# Patient Record
Sex: FEMALE | Race: BLACK OR AFRICAN AMERICAN | NOT HISPANIC OR LATINO | RURAL
[De-identification: names, ages, dates, MRNs, and addresses within clinical notes are randomized per-mention and may not be internally consistent; named-entity substitution may affect disease eponyms.]

---

## 2024-10-14 ENCOUNTER — HOSPITAL ENCOUNTER (EMERGENCY)
Facility: HOSPITAL | Age: 39
Discharge: HOME | End: 2024-10-14
Payer: COMMERCIAL

## 2024-10-14 VITALS
BODY MASS INDEX: 46.98 KG/M2 | OXYGEN SATURATION: 99 % | SYSTOLIC BLOOD PRESSURE: 149 MMHG | WEIGHT: 282 LBS | HEIGHT: 65 IN | HEART RATE: 78 BPM | DIASTOLIC BLOOD PRESSURE: 95 MMHG | TEMPERATURE: 99 F | RESPIRATION RATE: 20 BRPM

## 2024-10-14 DIAGNOSIS — M25.561 ACUTE PAIN OF RIGHT KNEE: Primary | ICD-10-CM

## 2024-10-14 PROCEDURE — 25000003 PHARM REV CODE 250: Performed by: NURSE PRACTITIONER

## 2024-10-14 PROCEDURE — 63600175 PHARM REV CODE 636 W HCPCS: Performed by: NURSE PRACTITIONER

## 2024-10-14 PROCEDURE — 96372 THER/PROPH/DIAG INJ SC/IM: CPT | Performed by: NURSE PRACTITIONER

## 2024-10-14 PROCEDURE — 99284 EMERGENCY DEPT VISIT MOD MDM: CPT | Mod: 25

## 2024-10-14 RX ORDER — KETOROLAC TROMETHAMINE 30 MG/ML
60 INJECTION, SOLUTION INTRAMUSCULAR; INTRAVENOUS
Status: COMPLETED | OUTPATIENT
Start: 2024-10-14 | End: 2024-10-14

## 2024-10-14 RX ORDER — MELOXICAM 7.5 MG/1
7.5 TABLET ORAL DAILY
Qty: 15 TABLET | Refills: 0 | Status: SHIPPED | OUTPATIENT
Start: 2024-10-14

## 2024-10-14 RX ORDER — HYDROCODONE BITARTRATE AND ACETAMINOPHEN 7.5; 325 MG/1; MG/1
1 TABLET ORAL
Status: COMPLETED | OUTPATIENT
Start: 2024-10-14 | End: 2024-10-14

## 2024-10-14 RX ADMIN — KETOROLAC TROMETHAMINE 60 MG: 30 INJECTION, SOLUTION INTRAMUSCULAR at 08:10

## 2024-10-14 RX ADMIN — HYDROCODONE BITARTRATE AND ACETAMINOPHEN 1 TABLET: 7.5; 325 TABLET ORAL at 08:10

## 2024-10-14 NOTE — Clinical Note
"Jahaira Cowandaniel" Bernadine was seen and treated in our emergency department on 10/14/2024.  She may return to work on 10/16/2024.       If you have any questions or concerns, please don't hesitate to call.      Vemla Wetzel, FNP"

## 2024-10-14 NOTE — ED TRIAGE NOTES
Pt states that she is having her R knee locking on her, then she stepped in a hole in the parking lot and it twisted.

## 2024-10-15 NOTE — ED PROVIDER NOTES
Encounter Date: 10/14/2024       History     Chief Complaint   Patient presents with    Knee Injury     R knee     Patient presents to ER with complaint of right knee pain. Patient reports she has chronic pain in her left knee. She states this has caused her to walk differently and now her right knee is swollen and painful to bear weight.  She denies known injury or accident.  Denies significant medical or surgical history.    The history is provided by the patient. No  was used.     Review of patient's allergies indicates:  No Known Allergies  History reviewed. No pertinent past medical history.  History reviewed. No pertinent surgical history.  No family history on file.  Social History     Tobacco Use    Smoking status: Never    Smokeless tobacco: Never     Review of Systems   Constitutional:  Positive for activity change and fatigue.   Musculoskeletal:  Positive for arthralgias (right knee pain, and chronic left knee pain) and joint swelling (right knee edema).   All other systems reviewed and are negative.      Physical Exam     Initial Vitals [10/14/24 1839]   BP Pulse Resp Temp SpO2   133/89 78 18 98.8 °F (37.1 °C) 99 %      MAP       --         Physical Exam    Nursing note and vitals reviewed.  Constitutional: She appears well-developed and well-nourished.   HENT:   Head: Normocephalic.   Nose: Nose normal. Mouth/Throat: Oropharynx is clear and moist.   Eyes: Conjunctivae and EOM are normal.   Neck: Neck supple.   Normal range of motion.  Cardiovascular:  Normal rate, normal heart sounds and intact distal pulses.           Pulmonary/Chest: Breath sounds normal.   Abdominal: Abdomen is soft. Bowel sounds are normal.   Musculoskeletal:         General: Tenderness and edema present.      Cervical back: Normal range of motion and neck supple.      Comments: Right knee edema. Limited ROM due to pain.      Neurological: She is alert and oriented to person, place, and time. She has normal  strength. GCS score is 15. GCS eye subscore is 4. GCS verbal subscore is 5. GCS motor subscore is 6.   Skin: Skin is warm and dry. Capillary refill takes less than 2 seconds.   Psychiatric: She has a normal mood and affect. Thought content normal.         Medical Screening Exam   See Full Note    ED Course   Procedures  Labs Reviewed - No data to display       Imaging Results              X-Ray Knee 1 or 2 View Right (Final result)  Result time 10/14/24 19:51:46      Final result by Elpidio Avila MD (10/14/24 19:51:46)                   Impression:      1. No acute displaced fracture or dislocation of the knee.      Electronically signed by: Elpidio Avila MD  Date:    10/14/2024  Time:    19:51               Narrative:    EXAMINATION:  XR KNEE 1 OR 2 VIEW RIGHT    CLINICAL HISTORY:  Right knee pain;    TECHNIQUE:  AP and lateral views of the right knee were performed.    COMPARISON:  None    FINDINGS:  Two views right knee.    No acute displaced fracture or dislocation of the knee.  No radiopaque foreign body.  No significant edema.  No large knee joint effusion.                                       Medications   ketorolac injection 60 mg (60 mg Intramuscular Given 10/14/24 2056)   HYDROcodone-acetaminophen 7.5-325 mg per tablet 1 tablet (1 tablet Oral Given 10/14/24 2056)     Medical Decision Making  Patient presents to ER with complaint of right knee pain. Patient reports she has chronic pain in her left knee. She states this has caused her to walk differently and now her right knee is swollen and painful to bear weight.  She denies known injury or accident.  Denies significant medical or surgical history.      Amount and/or Complexity of Data Reviewed  Radiology: ordered. Decision-making details documented in ED Course.     Details: Right knee x-ray negative for fracture or dislocation  Discussion of management or test interpretation with external provider(s): Toradol 60 mg IM  Norco 7.5 mg p.o. treat  pain  Patient verbalized improvement of pain prior to discharge    Patient was discharged home with diagnosis of acute pain of right knee.  She was given prescription for meloxicam to take as prescribed.  She was told to rest ice and elevate extremity for comfort.  She was instructed to call orthopedic clinic to schedule follow up appointment with Dr. Portillo.  She was given crutches to use for nonweightbearing until follow up with orthopedic clinic.  Patient verbalizes understanding agrees with plan of care.     Risk  Prescription drug management.                                      Clinical Impression:   Final diagnoses:  [M25.561] Acute pain of right knee (Primary)        ED Disposition Condition    Discharge Stable          ED Prescriptions       Medication Sig Dispense Start Date End Date Auth. Provider    meloxicam (MOBIC) 7.5 MG tablet Take 1 tablet (7.5 mg total) by mouth once daily. 15 tablet 10/14/2024 -- Velma Wetzel FNP          Follow-up Information       Follow up With Specialties Details Why Contact Info    Jung Portillo III, MD Orthopedic Surgery Schedule an appointment as soon as possible for a visit in 2 days If symptoms worsen [address] [place] [id number]  [phone]               Velma Wetzel FNP  10/14/24 0934

## 2024-10-15 NOTE — ED NOTES
Pt states she does have a ride home and is aware that she can not drive after taking the narcotic for at least 4 hours.  Pt states he ride will be picking her up.  Medications explained and toradol inj given and norco po given.

## 2024-10-15 NOTE — DISCHARGE INSTRUCTIONS
Take medication as prescribed.  Use crutches for nonweightbearing.  Schedule follow up appointment with Dr. Portillo in the orthopedic clinic.  Rest ice and elevate for comfort.  Return to the ER with new or worsening symptoms.

## 2024-10-17 ENCOUNTER — TELEPHONE (OUTPATIENT)
Dept: ORTHOPEDICS | Facility: CLINIC | Age: 39
End: 2024-10-17
Payer: COMMERCIAL

## 2024-10-17 NOTE — TELEPHONE ENCOUNTER
Attempted to call pt. No answer. Left message for pt. To call back. Pt. May be schedule with Yakelin or Madeline.

## 2025-03-28 ENCOUNTER — HOSPITAL ENCOUNTER (EMERGENCY)
Facility: HOSPITAL | Age: 40
Discharge: HOME | End: 2025-03-28
Payer: COMMERCIAL

## 2025-03-28 VITALS
RESPIRATION RATE: 18 BRPM | SYSTOLIC BLOOD PRESSURE: 121 MMHG | WEIGHT: 280 LBS | TEMPERATURE: 99 F | BODY MASS INDEX: 46.59 KG/M2 | DIASTOLIC BLOOD PRESSURE: 63 MMHG | HEART RATE: 86 BPM | OXYGEN SATURATION: 95 %

## 2025-03-28 DIAGNOSIS — Z87.898 HISTORY OF SEIZURES: ICD-10-CM

## 2025-03-28 DIAGNOSIS — R53.1 WEAKNESS: ICD-10-CM

## 2025-03-28 DIAGNOSIS — R32 URINARY INCONTINENCE, UNSPECIFIED TYPE: Primary | ICD-10-CM

## 2025-03-28 DIAGNOSIS — R42 DIZZINESS: ICD-10-CM

## 2025-03-28 LAB
ABORH RETYPE: NORMAL
ALBUMIN SERPL BCP-MCNC: 3.5 G/DL (ref 3.5–5)
ALBUMIN/GLOB SERPL: 1.1 {RATIO}
ALP SERPL-CCNC: 81 U/L (ref 40–150)
ALT SERPL W P-5'-P-CCNC: 18 U/L
ANION GAP SERPL CALCULATED.3IONS-SCNC: 12 MMOL/L (ref 7–16)
AST SERPL W P-5'-P-CCNC: 28 U/L (ref 11–45)
BASOPHILS # BLD AUTO: 0.04 K/UL (ref 0–0.2)
BASOPHILS NFR BLD AUTO: 0.5 % (ref 0–1)
BILIRUB SERPL-MCNC: 0.3 MG/DL
BILIRUB UR QL STRIP: NEGATIVE
BUN SERPL-MCNC: 8 MG/DL (ref 7–19)
BUN/CREAT SERPL: 12 (ref 6–20)
CALCIUM SERPL-MCNC: 9.2 MG/DL (ref 8.4–10.2)
CHLORIDE SERPL-SCNC: 105 MMOL/L (ref 98–107)
CLARITY UR: CLEAR
CO2 SERPL-SCNC: 26 MMOL/L (ref 22–29)
COLOR UR: ABNORMAL
CREAT SERPL-MCNC: 0.68 MG/DL (ref 0.55–1.02)
DIFFERENTIAL METHOD BLD: ABNORMAL
EGFR (NO RACE VARIABLE) (RUSH/TITUS): 114 ML/MIN/1.73M2
EOSINOPHIL # BLD AUTO: 0.13 K/UL (ref 0–0.5)
EOSINOPHIL NFR BLD AUTO: 1.7 % (ref 1–4)
ERYTHROCYTE [DISTWIDTH] IN BLOOD BY AUTOMATED COUNT: 13.1 % (ref 11.5–14.5)
GLOBULIN SER-MCNC: 3.3 G/DL (ref 2–4)
GLUCOSE SERPL-MCNC: 103 MG/DL (ref 70–105)
GLUCOSE SERPL-MCNC: 92 MG/DL (ref 74–100)
GLUCOSE UR STRIP-MCNC: NORMAL MG/DL
HCT VFR BLD AUTO: 38.4 % (ref 38–47)
HGB BLD-MCNC: 12 G/DL (ref 12–16)
IMM GRANULOCYTES # BLD AUTO: 0.06 K/UL (ref 0–0.04)
IMM GRANULOCYTES NFR BLD: 0.8 % (ref 0–0.4)
INDIRECT COOMBS: NORMAL
KETONES UR STRIP-SCNC: NEGATIVE MG/DL
LEUKOCYTE ESTERASE UR QL STRIP: ABNORMAL
LYMPHOCYTES # BLD AUTO: 2.48 K/UL (ref 1–4.8)
LYMPHOCYTES NFR BLD AUTO: 32.9 % (ref 27–41)
MAGNESIUM SERPL-MCNC: 1.9 MG/DL (ref 1.6–2.6)
MCH RBC QN AUTO: 28.3 PG (ref 27–31)
MCHC RBC AUTO-ENTMCNC: 31.3 G/DL (ref 32–36)
MCV RBC AUTO: 90.6 FL (ref 80–96)
MONOCYTES # BLD AUTO: 0.42 K/UL (ref 0–0.8)
MONOCYTES NFR BLD AUTO: 5.6 % (ref 2–6)
MPC BLD CALC-MCNC: 12.6 FL (ref 9.4–12.4)
MUCOUS, UA: ABNORMAL /LPF
NEUTROPHILS # BLD AUTO: 4.41 K/UL (ref 1.8–7.7)
NEUTROPHILS NFR BLD AUTO: 58.5 % (ref 53–65)
NITRITE UR QL STRIP: NEGATIVE
NRBC # BLD AUTO: 0 X10E3/UL
NRBC, AUTO (.00): 0 %
PH UR STRIP: 6.5 PH UNITS
PLATELET # BLD AUTO: 211 K/UL (ref 150–400)
POTASSIUM SERPL-SCNC: 4.2 MMOL/L (ref 3.5–5.1)
PROT SERPL-MCNC: 6.8 G/DL (ref 6.4–8.3)
PROT UR QL STRIP: NEGATIVE
RBC # BLD AUTO: 4.24 M/UL (ref 4.2–5.4)
RBC # UR STRIP: NEGATIVE /UL
RBC #/AREA URNS HPF: 1 /HPF
RH BLD: NORMAL
SODIUM SERPL-SCNC: 139 MMOL/L (ref 136–145)
SP GR UR STRIP: 1.01
SPECIMEN OUTDATE: NORMAL
SQUAMOUS #/AREA URNS LPF: ABNORMAL /HPF
TSH SERPL DL<=0.005 MIU/L-ACNC: 1.43 UIU/ML (ref 0.35–4.94)
UROBILINOGEN UR STRIP-ACNC: NORMAL MG/DL
WBC # BLD AUTO: 7.54 K/UL (ref 4.5–11)
WBC #/AREA URNS HPF: 2 /HPF

## 2025-03-28 PROCEDURE — 86900 BLOOD TYPING SEROLOGIC ABO: CPT | Performed by: NURSE PRACTITIONER

## 2025-03-28 PROCEDURE — 83735 ASSAY OF MAGNESIUM: CPT | Performed by: NURSE PRACTITIONER

## 2025-03-28 PROCEDURE — 85025 COMPLETE CBC W/AUTO DIFF WBC: CPT | Performed by: NURSE PRACTITIONER

## 2025-03-28 PROCEDURE — 80053 COMPREHEN METABOLIC PANEL: CPT | Performed by: NURSE PRACTITIONER

## 2025-03-28 PROCEDURE — 82962 GLUCOSE BLOOD TEST: CPT

## 2025-03-28 PROCEDURE — 81003 URINALYSIS AUTO W/O SCOPE: CPT | Performed by: NURSE PRACTITIONER

## 2025-03-28 PROCEDURE — 93010 ELECTROCARDIOGRAM REPORT: CPT | Mod: ,,, | Performed by: INTERNAL MEDICINE

## 2025-03-28 PROCEDURE — 36415 COLL VENOUS BLD VENIPUNCTURE: CPT | Performed by: NURSE PRACTITIONER

## 2025-03-28 PROCEDURE — 93005 ELECTROCARDIOGRAM TRACING: CPT

## 2025-03-28 PROCEDURE — 99284 EMERGENCY DEPT VISIT MOD MDM: CPT | Mod: 25

## 2025-03-28 NOTE — Clinical Note
"Jahaira Cowandaniel" Bernadine was seen and treated in our emergency department on 3/28/2025.  She may return to work on 03/31/2025.       If you have any questions or concerns, please don't hesitate to call.      Adelaide Friedman, FNP"

## 2025-03-28 NOTE — DISCHARGE INSTRUCTIONS
Follow up/establish care with PCP in the next 1 week. Neurology referral to re-establish care/evaluate seizure history. Return to ED if any new or worsening of symptoms occur.

## 2025-03-28 NOTE — ED PROVIDER NOTES
Encounter Date: 3/28/2025       History     Chief Complaint   Patient presents with    Blurred Vision    Urinary Incontinence    Dizziness     39 year old female presents to ED with complaint of weakness, dizziness, and urinary incontinence. Patient states she has been weak/dizzy the last few days with worsening on this morning but went to work. She states she urinated on herself at work and was noted by co-workers to be dizzy and walking sideways. She states she has been having problems with her bowels/urine and going on herself for several weeks. Patient states she has a history of seizures and has not been on medications in 2 or more years; reasons not disclosed. She also reports having abnormal pelvis scan but states she did not workup the issue due to seizure activity and being scared. Patient also reports history of anemia requiring blood/iron transfusions but has not had one in several years. Denies known fever, chills, chest pain, shortness of breath.     The history is provided by the patient.     Review of patient's allergies indicates:  No Known Allergies  Past Medical History:   Diagnosis Date    Anemia     Anxiety attack     Bipolar disorder     Seizures      Past Surgical History:   Procedure Laterality Date     SECTION       No family history on file.  Social History[1]  Review of Systems   Constitutional:  Negative for chills and fever.   Eyes:  Negative for photophobia and visual disturbance.   Respiratory:  Negative for cough and shortness of breath.    Cardiovascular:  Negative for chest pain and palpitations.   Gastrointestinal:  Negative for nausea and vomiting.   Genitourinary:  Positive for urgency. Negative for frequency.   Musculoskeletal:  Negative for arthralgias and gait problem.   Skin:  Negative for color change and wound.   Neurological:  Positive for dizziness and weakness.   Hematological:  Negative for adenopathy. Does not bruise/bleed easily.   Psychiatric/Behavioral:   Negative for agitation and confusion.    All other systems reviewed and are negative.      Physical Exam     Initial Vitals [03/28/25 1444]   BP Pulse Resp Temp SpO2   128/89 92 18 98.8 °F (37.1 °C) 99 %      MAP       --         Physical Exam    Nursing note and vitals reviewed.  Constitutional: She appears well-developed and well-nourished.   HENT:   Head: Normocephalic and atraumatic.   Eyes: EOM are normal. Pupils are equal, round, and reactive to light.   Neck: Neck supple.   Normal range of motion.  Cardiovascular:  Normal rate and regular rhythm.           No murmur heard.  Pulmonary/Chest: She has no wheezes. She has no rhonchi.   Abdominal: Abdomen is soft. She exhibits no distension. There is no abdominal tenderness.   Musculoskeletal:         General: No tenderness or edema.      Cervical back: Normal range of motion and neck supple.     Lymphadenopathy:     She has no cervical adenopathy.   Neurological: She is alert and oriented to person, place, and time. No cranial nerve deficit or sensory deficit.   Skin: Skin is warm and dry. Capillary refill takes less than 2 seconds.   Psychiatric: She has a normal mood and affect. Thought content normal.         Medical Screening Exam   See Full Note    ED Course   Procedures  Labs Reviewed   URINALYSIS, REFLEX TO URINE CULTURE - Abnormal       Result Value    Color, UA Light Yellow      Clarity, UA Clear      pH, UA 6.5      Leukocytes, UA Small (*)     Nitrites, UA Negative      Protein, UA Negative      Glucose, UA Normal      Ketones, UA Negative      Urobilinogen, UA Normal      Bilirubin, UA Negative      Blood, UA Negative      Specific Gravity, UA 1.012     CBC WITH DIFFERENTIAL - Abnormal    WBC 7.54      RBC 4.24      Hemoglobin 12.0      Hematocrit 38.4      MCV 90.6      MCH 28.3      MCHC 31.3 (*)     RDW 13.1      Platelet Count 211      MPV 12.6 (*)     Neutrophils % 58.5      Lymphocytes % 32.9      Monocytes % 5.6      Eosinophils % 1.7       Basophils % 0.5      Immature Granulocytes % 0.8 (*)     nRBC, Auto 0.0      Neutrophils, Abs 4.41      Lymphocytes, Absolute 2.48      Monocytes, Absolute 0.42      Eosinophils, Absolute 0.13      Basophils, Absolute 0.04      Immature Granulocytes, Absolute 0.06 (*)     nRBC, Absolute 0.00      Diff Type Auto     URINALYSIS, MICROSCOPIC - Abnormal    WBC, UA 2      RBC, UA 1      Squamous Epithelial Cells, UA Occasional (*)     Mucous Occasional (*)    MAGNESIUM - Normal    Magnesium 1.9     TSH - Normal    TSH 1.429     CBC W/ AUTO DIFFERENTIAL    Narrative:     The following orders were created for panel order CBC auto differential.  Procedure                               Abnormality         Status                     ---------                               -----------         ------                     CBC with Differential[[id number]]       Abnormal            Final result                 Please view results for these tests on the individual orders.   COMPREHENSIVE METABOLIC PANEL    Sodium 139      Potassium 4.2      Chloride 105      CO2 26      Anion Gap 12      Glucose 92      BUN 8      Creatinine 0.68      BUN/Creatinine Ratio 12      Calcium 9.2      Total Protein 6.8      Albumin 3.5      Globulin 3.3      A/G Ratio 1.1      Bilirubin, Total 0.3      Alk Phos 81      ALT 18      AST 28      eGFR 114     TYPE & SCREEN    Specimen Outdate 03/31/2025 23:59      Group & Rh A POS      Indirect Carlos NEG     ABORH RETYPE    ABORH Retype A POS     POCT GLUCOSE MONITORING CONTINUOUS    POC Glucose 103            Imaging Results    None          Medications - No data to display  Medical Decision Making  39 year old female presents to ED with complaint of weakness, dizziness, and urinary incontinence. Patient states she has been weak/dizzy the last few days with worsening on this morning but went to work. She states she urinated on herself at work and was noted by co-workers to be dizzy and walking sideways.  She states she has been having problems with her bowels/urine and going on herself for several weeks. Patient states she has a history of seizures and has not been on medications in 2 or more years; reasons not disclosed. She also reports having abnormal pelvis scan but states she did not workup the issue due to seizure activity and being scared. Patient also reports history of anemia requiring blood/iron transfusions but has not had one in several years. Denies known fever, chills, chest pain, shortness of breath.       Labs ordered and reviewed  Discussed findings with patient and need for PCP and to re-establish care with Neurology due to history of seizures.     EKG ordered and reviewed  EKG significant for no ST elevation  Rate 86  Rhythm sinus rhythm  Interpreted by ED physician        Amount and/or Complexity of Data Reviewed  Labs: ordered.                                      Clinical Impression:   Final diagnoses:  [R53.1] Weakness  [R32] Urinary incontinence, unspecified type (Primary)  [Z87.898] History of seizures  [R42] Dizziness        ED Disposition Condition    Discharge Stable          ED Prescriptions    None       Follow-up Information       Follow up With Specialties Details Why Contact Info    Mary Beth Keene MD Family Medicine Schedule an appointment as soon as possible for a visit   [address] [facility] [place] [address] [phone]                 [1]   Social History  Tobacco Use    Smoking status: Never    Smokeless tobacco: Never   Substance Use Topics    Drug use: Never        Adelaide Friedman, DORIAN  03/28/25 5333

## 2025-03-28 NOTE — ED TRIAGE NOTES
Patient presents to ED with c/o blurry vision, dizziness, and urinary incontinence.  States that she was at work when she all of a sudden became dizzy, had blurred vision and became incontinent.  States she had a prior episode this week of fecal incontinence as well.

## 2025-03-29 LAB
OHS QRS DURATION: 88 MS
OHS QTC CALCULATION: 416 MS

## 2025-06-14 ENCOUNTER — HOSPITAL ENCOUNTER (EMERGENCY)
Facility: HOSPITAL | Age: 40
Discharge: HOME | End: 2025-06-14
Attending: EMERGENCY MEDICINE

## 2025-06-14 VITALS
HEART RATE: 76 BPM | HEIGHT: 65 IN | BODY MASS INDEX: 47.15 KG/M2 | WEIGHT: 283 LBS | RESPIRATION RATE: 16 BRPM | DIASTOLIC BLOOD PRESSURE: 86 MMHG | OXYGEN SATURATION: 98 % | TEMPERATURE: 98 F | SYSTOLIC BLOOD PRESSURE: 129 MMHG

## 2025-06-14 DIAGNOSIS — F32.A DEPRESSION, UNSPECIFIED DEPRESSION TYPE: Primary | ICD-10-CM

## 2025-06-14 DIAGNOSIS — R53.83 FATIGUE, UNSPECIFIED TYPE: ICD-10-CM

## 2025-06-14 PROCEDURE — 99281 EMR DPT VST MAYX REQ PHY/QHP: CPT

## 2025-06-14 NOTE — ED PROVIDER NOTES
Encounter Date: 2025    SCRIBE #1 NOTE: I, Jagdeep Cruz, am scribing for, and in the presence of,  Calixto Leary MD. I have scribed the entire note.       History     Chief Complaint   Patient presents with    Headache    Dizziness     Presents to ED for complaints of headache and dizziness since yesterday.  Patient came to get blood pressure checked but B/P within normal limits.     Jahaira Colindrse is a 39 y.o. female presenting to the ED with c/o weakness and lightheadedness that has been on and off for about 1.5 months. PT states every time she goes to the doctor she's told that her BP is low. She reports that she has never been on BP medication. PT denies fever, cough, vomiting, diarrhea, appetite changes and blood in stool. PT has Hx of Anemia, Anxiety attack, Bipolar disorder, and Seizures.    The history is provided by the patient. No  was used.     Review of patient's allergies indicates:  No Known Allergies  Past Medical History:   Diagnosis Date    Anemia     Anxiety attack     Bipolar disorder     Seizures      Past Surgical History:   Procedure Laterality Date     SECTION       No family history on file.  Social History[1]  Review of Systems   Constitutional:  Negative for appetite change and fever.   Respiratory:  Negative for cough.    Gastrointestinal:  Negative for blood in stool, diarrhea and vomiting.   Neurological:  Positive for weakness and light-headedness.   All other systems reviewed and are negative.      Physical Exam     Initial Vitals [25 1839]   BP Pulse Resp Temp SpO2   129/86 76 16 98.1 °F (36.7 °C) 98 %      MAP       --         Physical Exam    Nursing note and vitals reviewed.  HENT:   Head: Normocephalic and atraumatic. Mouth/Throat: Oropharynx is clear and moist.   Eyes: Pupils are equal, round, and reactive to light.   Neck: Neck supple.   Normal range of motion.  Cardiovascular:  Normal rate and regular rhythm.            Pulmonary/Chest: Effort normal and breath sounds normal.   Abdominal: Abdomen is soft. She exhibits no distension.   Musculoskeletal:         General: Normal range of motion.      Cervical back: Normal range of motion and neck supple.     Neurological: She is alert.   Skin: Skin is warm. Capillary refill takes less than 2 seconds.   Psychiatric: She has a normal mood and affect.       ED Course   Procedures  Labs Reviewed - No data to display       Imaging Results    None          Medications - No data to display  Medical Decision Making            Attending Attestation:           Physician Attestation for Scribe:  Physician Attestation Statement for Scribe #1: I, Calixto Leary MD, reviewed documentation, as scribed by Jagdeep Cruz in my presence, and it is both accurate and complete.             ED Course as of 06/14/25 2317   Sat Jun 14, 2025   1903 Medical decision-making:  Differential diagnosis includes anxiety, depression, bipolar disorder, noncompliance with medication.  No labs or imaging were performed on this patient. [BB]      ED Course User Index  [BB] Calixto Leary MD                           Clinical Impression:  Final diagnoses:  [F32.A] Depression, unspecified depression type (Primary)  [R53.83] Fatigue, unspecified type          ED Disposition Condition    Discharge Stable          ED Prescriptions    None       Follow-up Information       Follow up With Specialties Details Why Contact Info    Lea Roger MD Family Medicine   [address] [facility]  [place] [address]  [phone]      [facility]    [address] [facility] [address]  [phone]    [address] [place] [address]  [phone]                     [1]   Social History  Tobacco Use    Smoking status: Never    Smokeless tobacco: Never   Substance Use Topics    Drug use: Never        Calixto Leary MD  06/14/25 0949

## 2025-06-15 NOTE — DISCHARGE INSTRUCTIONS
Follow up in clinic with primary care provider in 2-3 days for recheck.  Follow up in clinic with psychiatrist to discuss ongoing symptoms.  Return to emergency department for any worsening or further problems.

## 2025-06-22 ENCOUNTER — HOSPITAL ENCOUNTER (EMERGENCY)
Facility: HOSPITAL | Age: 40
Discharge: HOME | End: 2025-06-22

## 2025-06-22 VITALS
BODY MASS INDEX: 47.32 KG/M2 | HEART RATE: 87 BPM | WEIGHT: 284 LBS | RESPIRATION RATE: 20 BRPM | SYSTOLIC BLOOD PRESSURE: 131 MMHG | HEIGHT: 65 IN | TEMPERATURE: 98 F | OXYGEN SATURATION: 100 % | DIASTOLIC BLOOD PRESSURE: 85 MMHG

## 2025-06-22 DIAGNOSIS — Z04.9 SUSPECTED CONDITION NOT FOUND: Primary | ICD-10-CM

## 2025-06-22 PROCEDURE — 99284 EMERGENCY DEPT VISIT MOD MDM: CPT

## 2025-06-23 NOTE — DISCHARGE INSTRUCTIONS
Obtain and follow up with an OBGYN provider as soon as possible.  Follow up with your primary care provider in 2 days. Return to the emergency department for any increase in symptoms or for any other new or worrisome symptoms.

## 2025-06-23 NOTE — ED PROVIDER NOTES
Encounter Date: 2025       History     Chief Complaint   Patient presents with    Foreign Body in Vagina     Pt presents to ED with c/o possible tampon in vagina. States she put one in last night and fell asleep with it in and duke to use the restroom this morning and could not find it.      39 year old female presents to the emergency department to be evaluated for a possible tampon that remains in the vagina since last night around 7pm.  When she woke this morning, she states the tampon string was not there.  She has had some menstrual cramping today, and felt it may be related to a retained tampon. She has tried warm bath soaks with no relief.  Denies any fever, nausea or vomiting.      The history is provided by the patient.     Review of patient's allergies indicates:  No Known Allergies  Past Medical History:   Diagnosis Date    Anemia     Anxiety attack     Bipolar disorder     Seizures      Past Surgical History:   Procedure Laterality Date     SECTION       No family history on file.  Social History[1]  Review of Systems   Constitutional: Negative.    HENT: Negative.     Respiratory: Negative.     Cardiovascular: Negative.    Gastrointestinal: Negative.    Genitourinary:  Positive for menstrual problem and pelvic pain.   Musculoskeletal: Negative.    Neurological: Negative.    Psychiatric/Behavioral: Negative.         Physical Exam     Initial Vitals [25 1834]   BP Pulse Resp Temp SpO2   131/85 87 20 98.4 °F (36.9 °C) 100 %      MAP       --         Physical Exam    Vitals reviewed.  Constitutional: She appears well-developed and well-nourished. She is not diaphoretic. No distress.   HENT:   Head: Normocephalic and atraumatic.   Right Ear: External ear normal.   Left Ear: External ear normal.   Nose: Nose normal. Mouth/Throat: Oropharynx is clear and moist. No oropharyngeal exudate.   Eyes: EOM are normal. Pupils are equal, round, and reactive to light.   Neck: Neck supple.   Normal  range of motion.  Cardiovascular:  Normal rate, regular rhythm and intact distal pulses.           Abdominal: Abdomen is soft. She exhibits no distension. There is no abdominal tenderness.   Genitourinary:    Pelvic exam was performed with patient prone.      Vaginal tenderness and bleeding present.      No vaginal discharge or erythema.   There is tenderness and bleeding in the vagina. No erythema in the vagina.    No foreign body in the vagina.      No signs of injury in the vagina.     Musculoskeletal:         General: No tenderness or edema. Normal range of motion.      Cervical back: Normal range of motion and neck supple.     Neurological: She is alert and oriented to person, place, and time. She has normal strength. No cranial nerve deficit or sensory deficit.   Skin: Skin is warm and dry. Capillary refill takes less than 2 seconds.   Psychiatric: She has a normal mood and affect. Thought content normal.         Medical Screening Exam   See Full Note    ED Course   Procedures  Labs Reviewed - No data to display       Imaging Results    None          Medications - No data to display  Medical Decision Making  39 year old female presents to the emergency department to be evaluated for a possible tampon that remains in the vagina since last night around 7pm.  When she woke this morning, she states the tampon string was not there.  She has had some menstrual cramping today, and felt it may be related to a retained tampon. She has tried warm bath soaks with no relief.  Denies any fever, nausea or vomiting.      Vaginal specula exam performed with chaperone, Alethea Mane RN.  No foreign body identified.  Blood noted in the vaginal vault as expected for menstrual cycle.  No other discharge noted.    Diagnosis: suspected foreign body not found. No tampon noted in vaginal vault.  Encouraged follow up with PCP or OBGYN.  May continue warm bath soaks or warm compress for pelvic discomfort.  Verbalized understanding.                                        Clinical Impression:   Final diagnoses:  [Z04.9] Suspected condition not found (Primary)        ED Disposition Condition    Discharge Stable          ED Prescriptions    None       Follow-up Information    None            [1]   Social History  Tobacco Use    Smoking status: Never    Smokeless tobacco: Never   Substance Use Topics    Drug use: Never        Nader Manning FNP  06/22/25 191